# Patient Record
Sex: MALE | Race: BLACK OR AFRICAN AMERICAN | NOT HISPANIC OR LATINO | Employment: FULL TIME | URBAN - METROPOLITAN AREA
[De-identification: names, ages, dates, MRNs, and addresses within clinical notes are randomized per-mention and may not be internally consistent; named-entity substitution may affect disease eponyms.]

---

## 2017-11-03 ENCOUNTER — TELEPHONE (OUTPATIENT)
Dept: BARIATRICS | Facility: CLINIC | Age: 27
End: 2017-11-03

## 2017-11-03 NOTE — TELEPHONE ENCOUNTER
Called madeleine  to call patient to verify insurance and cost of appt.  Will call patient once financial aspect is determined to see if patient wants to schedule appt or follow up with PCP and have labs done

## 2017-11-03 NOTE — TELEPHONE ENCOUNTER
Received call from patient.  He stated that he was due for his annual appt but that his insurance changed and wanted to know what his copay would be for the appt.   Fazal out so call transferred to phone room to enter insurance and informed patient to call his insurance as well to determine cost of appt.  Patient called back and left message that he would not be able to make the appt at this time because his out of pocket was going to be close to $500.

## 2018-06-28 ENCOUNTER — TELEPHONE (OUTPATIENT)
Dept: BARIATRICS | Facility: CLINIC | Age: 28
End: 2018-06-28

## 2018-06-28 NOTE — TELEPHONE ENCOUNTER
Patient is due for annual follow up care.  Called patient.  He stated that he does not have insurance at this time and is unable to pay for the follow up visit.  He is interested in following up and will call when he gets another job with insurance coverage.  Letter mailed to patient regarding follow up care.  Patient stated that he has not followed up with anyone since his last appt with us.  Patient stated that he was doing well with his wt loss and has been taking his vitamins as prescribed and no complaints or problems noted

## 2019-11-15 ENCOUNTER — TELEPHONE (OUTPATIENT)
Dept: BARIATRICS | Facility: CLINIC | Age: 29
End: 2019-11-15

## 2019-11-16 NOTE — TELEPHONE ENCOUNTER
Returned call from .  Patient stated that he now had bariatric benefits and wanted to see if insurance would pay for him to come back here for follow up.  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  Instructed patient to call back to [phone] when clinic reopens on Monday to update his insurance; once insurance is updated, he can call Lee Ann Mcneil, , to verify coverage and cost of visit- did not schedule appt prior to insurance verification because he is coming from [place] and did not want patient to come if not covered or able to pay for visit.

## 2019-11-21 ENCOUNTER — TELEPHONE (OUTPATIENT)
Dept: BARIATRICS | Facility: CLINIC | Age: 29
End: 2019-11-21

## 2019-11-21 NOTE — TELEPHONE ENCOUNTER
----- Message from Gina Marcano sent at 11/21/2019  3:21 PM CST -----  Contact: Venus ( [facility] ) [phone] Ext [phone]  Pt calling to schedule annual F/U he had surgery 2 years ago he stated that someone told him his insurance don't cover the visit.       Communication: [phone]

## 2019-12-03 NOTE — TELEPHONE ENCOUNTER
Patient called and stated that he needed a letter of confirmation of his appt to be mailed to send to insurance for verification of appt.  Informed patient that I could mail him an appt slip or if he needed a letter, I could type one and mail it.  Patient stated that appt slip was sufficient.  appt slip mailed.  Pt stated that he had labs drawn in October and will bring them to his appt.  Notified pt that after labs were reviewed per the NP that she would determine if other labs needed to be rescheduled.  Patient verbalized understanding

## 2019-12-13 ENCOUNTER — TELEPHONE (OUTPATIENT)
Dept: BARIATRICS | Facility: CLINIC | Age: 29
End: 2019-12-13